# Patient Record
Sex: FEMALE | Race: WHITE | NOT HISPANIC OR LATINO | Employment: UNEMPLOYED | URBAN - METROPOLITAN AREA
[De-identification: names, ages, dates, MRNs, and addresses within clinical notes are randomized per-mention and may not be internally consistent; named-entity substitution may affect disease eponyms.]

---

## 2021-02-19 ENCOUNTER — OFFICE VISIT (OUTPATIENT)
Dept: URGENT CARE | Facility: CLINIC | Age: 46
End: 2021-02-19

## 2021-02-19 VITALS
WEIGHT: 253 LBS | SYSTOLIC BLOOD PRESSURE: 140 MMHG | RESPIRATION RATE: 18 BRPM | DIASTOLIC BLOOD PRESSURE: 90 MMHG | HEART RATE: 105 BPM | TEMPERATURE: 97.4 F | OXYGEN SATURATION: 93 % | BODY MASS INDEX: 42.15 KG/M2 | HEIGHT: 65 IN

## 2021-02-19 DIAGNOSIS — G51.0 BELL'S PALSY: ICD-10-CM

## 2021-02-19 PROCEDURE — 99204 OFFICE O/P NEW MOD 45 MIN: CPT | Performed by: PHYSICIAN ASSISTANT

## 2021-02-19 RX ORDER — VALACYCLOVIR HYDROCHLORIDE 1 G/1
1000 TABLET, FILM COATED ORAL 3 TIMES DAILY
Qty: 21 TABLET | Refills: 0 | Status: SHIPPED | OUTPATIENT
Start: 2021-02-19 | End: 2021-02-26

## 2021-02-19 RX ORDER — CARBOXYMETHYLCELLULOSE SODIUM 5 MG/ML
1 SOLUTION/ DROPS OPHTHALMIC PRN
Qty: 15 ML | Refills: 0 | Status: SHIPPED | OUTPATIENT
Start: 2021-02-19 | End: 2021-03-01

## 2021-02-19 RX ORDER — PREDNISONE 20 MG/1
TABLET ORAL
Qty: 21 TABLET | Refills: 0 | Status: SHIPPED | OUTPATIENT
Start: 2021-02-19

## 2021-02-19 ASSESSMENT — ENCOUNTER SYMPTOMS
EYE REDNESS: 0
FEVER: 0
WEAKNESS: 0
SENSORY CHANGE: 0
NECK PAIN: 0
EYE PAIN: 0
VOMITING: 0
LOSS OF CONSCIOUSNESS: 0
DIARRHEA: 0
CHANGE IN BOWEL HABIT: 0
HEADACHES: 0
TINGLING: 1
COUGH: 0
DIZZINESS: 0
FOCAL WEAKNESS: 1
WHEEZING: 0
SORE THROAT: 0
EYE DISCHARGE: 0
SWOLLEN GLANDS: 0

## 2021-02-19 NOTE — PROGRESS NOTES
Subjective:      Andre Raza is a 45 y.o. female who presents with Jaw Pain (slight drooping on (L) side of face, (L) ear pain, sinus congestion, water eyes, x 5 days )            Patient is a 45-year-old female who presents to urgent care concern regarding droop to the left side of her face.  Patient reports it began as both of her eyes began to water of which she believes that this was secondary to allergies.  She then began to have slight sinus congestion.  Patient reports 2 days ago she began to feel slight decrease sensation to her face-with noted facial asymmetry-inability to close her left eye and drooping to her cheek and mouth.  She denies any change to her taste.  She also denies any unilateral weakness, difficulty breathing, headache, visual changes, extremity sensation changes or difficulty swallowing.  She denies any rash.  She also denies any eye pain, redness that she is aware of.  She does utilize glasses at home.    Other  This is a new problem. The current episode started in the past 7 days. The problem occurs constantly. The problem has been gradually worsening. Associated symptoms include congestion. Pertinent negatives include no change in bowel habit, coughing, fever, headaches, neck pain, rash, sore throat, swollen glands, urinary symptoms, vomiting or weakness. Nothing aggravates the symptoms. She has tried nothing for the symptoms.       Review of Systems   Constitutional: Negative for fever and malaise/fatigue.   HENT: Positive for congestion. Negative for sore throat.    Eyes: Negative for pain, discharge and redness.   Respiratory: Negative for cough and wheezing.    Gastrointestinal: Negative for change in bowel habit, diarrhea and vomiting.   Musculoskeletal: Negative for neck pain.   Skin: Negative for itching and rash.   Neurological: Positive for tingling and focal weakness. Negative for dizziness, sensory change, loss of consciousness, weakness and headaches.   All other  "systems reviewed and are negative.         Objective:     /90 (BP Location: Right arm, Patient Position: Sitting, BP Cuff Size: Adult long)   Pulse (!) 105   Temp 36.3 °C (97.4 °F) (Temporal)   Resp 18   Ht 1.651 m (5' 5\")   Wt 115 kg (253 lb)   SpO2 93%   BMI 42.10 kg/m²      Physical Exam  Vitals reviewed.   Constitutional:       General: She is not in acute distress.     Appearance: She is well-developed.   HENT:      Head: Normocephalic and atraumatic.      Right Ear: External ear normal.      Left Ear: External ear normal.      Nose: No congestion.      Mouth/Throat:      Pharynx: No oropharyngeal exudate or posterior oropharyngeal erythema.   Eyes:      General: No visual field deficit.     Pupils: Pupils are equal, round, and reactive to light.      Comments: Inability to close the left eye.  Conjunctiva is without erythema.  Extraocular motions intact.  Pupils are equal round and reactive to light.  No fluorescein uptake, no foreign body or corneal abrasion noted.  Also no evidence of dendritic pattern.   Neck:      Trachea: No tracheal deviation.   Cardiovascular:      Rate and Rhythm: Normal rate and regular rhythm.      Heart sounds: No murmur.   Pulmonary:      Effort: Pulmonary effort is normal. No respiratory distress.      Breath sounds: Normal breath sounds.   Musculoskeletal:         General: Normal range of motion.      Cervical back: Normal range of motion and neck supple.   Skin:     General: Skin is warm.      Findings: No rash.   Neurological:      Mental Status: She is alert and oriented to person, place, and time.      Cranial Nerves: Facial asymmetry present.      Comments: Noted facial droop on the left involving the forehead as well.   Psychiatric:         Behavior: Behavior normal.         Thought Content: Thought content normal.         Judgment: Judgment normal.                 Assessment/Plan:        1. Bell's palsy  - predniSONE (DELTASONE) 20 MG Tab; Take 3 tabs po daily " for 7 days  Dispense: 21 tablet; Refill: 0  - valacyclovir (VALTREX) 1 GM Tab; Take 1 tablet by mouth 3 times a day for 7 days.  Dispense: 21 tablet; Refill: 0  - carboxymethylcellulose (REFRESH TEARS) 0.5 % Solution; Administer 1 Drop into the left eye as needed for up to 10 days.  Dispense: 15 mL; Refill: 0    Symptoms very consistent with Bell's palsy without further neurological changes i.e. further extremity weakness, sensation changes, headache.  No evidence of eye injury.  Patient does wear eyeglasses of which encouraged her to utilize throughout the day to avoid corneal injury.  We will also write for refresh to assist with tears to avoid eye dryness.  Discussed worrisome signs and symptoms that would require emergent follow-up with us.  No evidence of noted rash or shingles.  Suspect viral etiology of such as patient had slight congestion prior to symptoms.  Discussed usual self-limiting nature of symptoms however did prompt patient to have follow-up with her PCP within 1 to 2 weeks.  Appropriate PPE worn at all times by provider.   Pt. Had face mask on throughout entirety of the visit other than oropharyngeal examination today.     Side effects of OTC or prescribed medications discussed.     DDX, Supportive care, and indications for immediate follow-up discussed with patient.    Instructed to return to clinic or nearest emergency department if we are not available for any change in condition, further concerns, or worsening of symptoms.    The patient and/or guardian demonstrated a good understanding and agreed with the treatment plan.    Please note that this dictation was created using voice recognition software. I have made every reasonable attempt to correct obvious errors, but I expect that there are errors of grammar and possibly content that I did not discover before finalizing the note.